# Patient Record
Sex: FEMALE | Race: WHITE | URBAN - METROPOLITAN AREA
[De-identification: names, ages, dates, MRNs, and addresses within clinical notes are randomized per-mention and may not be internally consistent; named-entity substitution may affect disease eponyms.]

---

## 2018-12-03 ENCOUNTER — OFFICE (OUTPATIENT)
Dept: URBAN - METROPOLITAN AREA CLINIC 101 | Facility: CLINIC | Age: 48
End: 2018-12-03
Payer: COMMERCIAL

## 2018-12-03 VITALS
HEIGHT: 68 IN | HEART RATE: 88 BPM | WEIGHT: 281 LBS | DIASTOLIC BLOOD PRESSURE: 93 MMHG | SYSTOLIC BLOOD PRESSURE: 138 MMHG | TEMPERATURE: 97.5 F

## 2018-12-03 DIAGNOSIS — R14.0 ABDOMINAL DISTENSION (GASEOUS): ICD-10-CM

## 2018-12-03 DIAGNOSIS — R14.2 ERUCTATION: ICD-10-CM

## 2018-12-03 DIAGNOSIS — R19.5 OTHER FECAL ABNORMALITIES: ICD-10-CM

## 2018-12-03 LAB
CELIAC DISEASE COMPREHENSIVE: DEAMIDATED GLIADIN ABS, IGA: 3 UNITS (ref 0–19)
CELIAC DISEASE COMPREHENSIVE: DEAMIDATED GLIADIN ABS, IGG: 2 UNITS (ref 0–19)
CELIAC DISEASE COMPREHENSIVE: ENDOMYSIAL ANTIBODY IGA: NEGATIVE
CELIAC DISEASE COMPREHENSIVE: IMMUNOGLOBULIN A, QN, SERUM: 168 MG/DL (ref 87–352)
CELIAC DISEASE COMPREHENSIVE: T-TRANSGLUTAMINASE (TTG) IGA: <2 U/ML
CELIAC DISEASE COMPREHENSIVE: T-TRANSGLUTAMINASE (TTG) IGG: <2 U/ML

## 2018-12-03 PROCEDURE — 99244 OFF/OP CNSLTJ NEW/EST MOD 40: CPT

## 2018-12-03 RX ORDER — PANTOPRAZOLE SODIUM 40 MG/1
TABLET, DELAYED RELEASE ORAL
Qty: 30 | Refills: 6 | Status: COMPLETED
End: 2023-12-21

## 2018-12-03 NOTE — SERVICEHPINOTES
VERNON WOODARD   is a   48   year old    female who is being seen in consultation at the request of   THIERNO BILLS   for belching and bloating. Had been experiencing these symptoms for several months. Over a few weeks, it has gotten worse. Burps every 30 min. Lying down makes the symptoms better. The only time when she is not burping is when sleeping. Occasional nausea without vomiting. Denies dysphagia, early satiety or weight loss. Weight gain 60 lbs since last year. + Occasional burning sensation in the stomach. + Occasional acid reflux over a few years. Tried Mylanta which caused diarrhea. Gas x did not work. OTC antacid did not work. Took mother's pantoprazole for few weeks, however did not see any difference. Not sure what dosage. BR Debbi eating salad at [employer] had diarrhea for 4 days which now has resolved. Denies blood in stool or melena. + bloating and gas. Denies recent antibiotic use, sick person contact or travel. BRDrinks filtered city water. BRReports having a bowel movement in AM 2-3 times on BSS type 4-6. Has a history of IBS. Bowel alternates between constipation and diarrhea. Had 2 colonoscopies and EGDs in 2011 and 2014. Last ones at [facility]. No personal history of colon polyps or IBD. No records to review at this time. Report a history of diverticulitis. Within year and a half, developed several medical issues.  BRStarted having seizures last year. Recently seen by Dr. Pendleton and had EEG. Told to have non epileptic seizure.  Also diagnosed with pots dysautonomia. Sees Dr. Russo, at [facility]. BRSees Dr. Kolb, cardiologist for heart palpitations and POTS. Denies taking NSAIDs regular basis. Used to take Ibuprofen in 2005, 2006 after back surgery. Has palpitations and sob with exertion. Most recent blood work was done at [facility] by endocrinologist. Checked thyroid panel and hormone levels which were unremarkable.  Mother has a history of colitis. Denies family history of colon cancer, IBD or celiac disease. Celiac panel in 2016 was negative. BR

## 2018-12-07 LAB
CALPROTECTIN, FECAL: <16 UG/G
GIARDIA/CRYPTOSPORIDIUM EIA: CRYPTOSPORIDIUM EIA: NEGATIVE
GIARDIA/CRYPTOSPORIDIUM EIA: GIARDIA LAMBLIA AG, EIA: NEGATIVE
H. PYLORI STOOL AG, EIA: NEGATIVE
OVA + PARASITE EXAM: (no result)
PANCREATIC ELASTASE, FECAL: >500 UG ELAST./G
RESULT: RESULT 1: (no result)
STOOL CULTURE: CAMPYLOBACTER CULTURE: (no result)
STOOL CULTURE: E COLI SHIGA TOXIN EIA: NEGATIVE
STOOL CULTURE: SALMONELLA/SHIGELLA SCREEN: (no result)

## 2018-12-21 ENCOUNTER — OFFICE (OUTPATIENT)
Dept: URBAN - METROPOLITAN AREA CLINIC 101 | Facility: CLINIC | Age: 48
End: 2018-12-21
Payer: COMMERCIAL

## 2018-12-21 VITALS
DIASTOLIC BLOOD PRESSURE: 77 MMHG | HEART RATE: 93 BPM | HEIGHT: 68 IN | WEIGHT: 281 LBS | TEMPERATURE: 98.1 F | SYSTOLIC BLOOD PRESSURE: 123 MMHG

## 2018-12-21 DIAGNOSIS — R14.2 ERUCTATION: ICD-10-CM

## 2018-12-21 DIAGNOSIS — R19.5 OTHER FECAL ABNORMALITIES: ICD-10-CM

## 2018-12-21 PROCEDURE — 99214 OFFICE O/P EST MOD 30 MIN: CPT

## 2018-12-21 NOTE — SERVICEHPINOTES
VERNON WOODARD   is a   48  female who presents for followup. She had recent GI workup including UGI, RUQ u/s, stool studies and celiac panel which were all unremarkable.  Has been on pantoprazole since the last visit. Acid reflux has improved. The burping also has improved. Burping seems to correlate with anxiety and stress. Now on GERD diet and low FODMAP diet. Still has occasional burping. Seen by cardiologist and the peralta monitor result was unremarkable. Will be seen by neurologist in January.  Bowel movements fluctuate with diarrhea which is mostly dietary related. Normally, has type 4 stool without blood or melena. Recent stool studies were negative for stool culture, ova + parasites, fecal calprotectin was wnl and showed no signs of pancreatic insufficiency.

## 2019-03-08 ENCOUNTER — OFFICE (OUTPATIENT)
Dept: URBAN - METROPOLITAN AREA CLINIC 101 | Facility: CLINIC | Age: 49
End: 2019-03-08

## 2019-03-08 VITALS
DIASTOLIC BLOOD PRESSURE: 79 MMHG | HEART RATE: 91 BPM | HEIGHT: 68 IN | SYSTOLIC BLOOD PRESSURE: 119 MMHG | TEMPERATURE: 97.7 F | WEIGHT: 281 LBS

## 2019-03-08 DIAGNOSIS — K59.09 OTHER CONSTIPATION: ICD-10-CM

## 2019-03-08 DIAGNOSIS — K21.9 GASTRO-ESOPHAGEAL REFLUX DISEASE WITHOUT ESOPHAGITIS: ICD-10-CM

## 2019-03-08 DIAGNOSIS — R14.2 ERUCTATION: ICD-10-CM

## 2019-03-08 PROCEDURE — 99214 OFFICE O/P EST MOD 30 MIN: CPT

## 2019-03-08 RX ORDER — PANTOPRAZOLE SODIUM 20 MG/1
20 TABLET, DELAYED RELEASE ORAL
Qty: 90 | Refills: 5 | Status: COMPLETED
Start: 2019-03-08 | End: 2023-12-21

## 2019-03-08 RX ORDER — BACLOFEN 10 MG/1
TABLET ORAL
Qty: 90 | Refills: 4 | Status: COMPLETED
Start: 2019-03-08 | End: 2023-12-21

## 2019-03-08 NOTE — SERVICEHPINOTES
VERNON WOODARD   is a   48  female who presents for follow up. Has been taking baclofen 10 mg at night for constant belching. The belching has improved since taking it. BRAcid reflux is improved with pantoprazole 40 mg once daily. Wishes to taper down. BRConstipation has developed since taking the baclofen. Moves bowel daily on BSS type 2. Denies blood in stool or melena. Occasional diarrhea. + Bloating. BRPOTS dysautonomia and seizures have been stable, sees Dr. Russo at [facility]. BR

## 2024-03-14 ENCOUNTER — TELEHEALTH PROVIDED OTHER THAN IN PATIENT'S HOME (OUTPATIENT)
Dept: URBAN - METROPOLITAN AREA TELEHEALTH 3 | Facility: TELEHEALTH | Age: 54
End: 2024-03-14

## 2024-03-14 VITALS — WEIGHT: 282 LBS | HEIGHT: 68 IN

## 2024-03-14 DIAGNOSIS — Z12.11 ENCOUNTER FOR SCREENING FOR MALIGNANT NEOPLASM OF COLON: ICD-10-CM

## 2024-03-14 DIAGNOSIS — K57.31 DIVERTICULOSIS OF LARGE INTESTINE WITHOUT PERFORATION OR ABS: ICD-10-CM

## 2024-03-14 DIAGNOSIS — K58.9 IRRITABLE BOWEL SYNDROME WITHOUT DIARRHEA: ICD-10-CM

## 2024-03-14 DIAGNOSIS — K21.9 GASTRO-ESOPHAGEAL REFLUX DISEASE WITHOUT ESOPHAGITIS: ICD-10-CM

## 2024-03-14 PROCEDURE — 99204 OFFICE O/P NEW MOD 45 MIN: CPT | Mod: 95 | Performed by: INTERNAL MEDICINE

## 2024-03-14 NOTE — SERVICENOTES
Patient's visit was conducted through [employer] video telecommunication. Patient consented before the start of visit as to understanding of privacy concerns, possible technological failure, and their responsibility of carrying out instructions of plan.

## 2024-03-14 NOTE — SERVICEHPINOTES
PATIENT VERIFIED BY DATE OF BIRTH AND NAME. Patient has been consented for this telecommunication visit.
sincere post 
52 yo WF w/ PMhx of obesity, fibromyalgia, IBS, mild MATIAS, here for OV prior to colonoscopy.  She started weight watchers, so eating more vegetables and protein which has caused more constipation.   Last November she had ankle tendon repair which required some narcotics - this caused more constipation issues.  Without laxatives, she can go up to 2 days without a BM, which will cause bloating, distension, lower back pain.  She denies any bright red blood in stools.  She is trying to drink plenty of water.  She gets GERD on occasion, but no nausea or vomiting.  
br
brShe denies hx of MI, CVA, other blood disorders.  br 
sincere She had a C-scope by Dr. Dominguez and Dr. White (approx 7-10 yrs ago) - she was told of having diverticulosis, no polyps. reportedly done.  sincere

## 2024-04-05 ENCOUNTER — OFFICE (OUTPATIENT)
Dept: URBAN - METROPOLITAN AREA CLINIC 102 | Facility: CLINIC | Age: 54
End: 2024-04-05

## 2024-04-05 PROCEDURE — 00031: CPT | Performed by: INTERNAL MEDICINE
